# Patient Record
Sex: MALE | Race: WHITE | NOT HISPANIC OR LATINO | URBAN - METROPOLITAN AREA
[De-identification: names, ages, dates, MRNs, and addresses within clinical notes are randomized per-mention and may not be internally consistent; named-entity substitution may affect disease eponyms.]

---

## 2018-06-07 ENCOUNTER — OUTPATIENT (OUTPATIENT)
Dept: OUTPATIENT SERVICES | Facility: HOSPITAL | Age: 33
LOS: 1 days | Discharge: HOME | End: 2018-06-07

## 2018-06-07 DIAGNOSIS — R07.9 CHEST PAIN, UNSPECIFIED: ICD-10-CM

## 2022-04-05 ENCOUNTER — INPATIENT (INPATIENT)
Facility: HOSPITAL | Age: 37
LOS: 2 days | Discharge: HOME | End: 2022-04-08
Attending: INTERNAL MEDICINE | Admitting: INTERNAL MEDICINE
Payer: COMMERCIAL

## 2022-04-05 VITALS
OXYGEN SATURATION: 99 % | DIASTOLIC BLOOD PRESSURE: 114 MMHG | HEIGHT: 70 IN | HEART RATE: 104 BPM | WEIGHT: 179.9 LBS | SYSTOLIC BLOOD PRESSURE: 187 MMHG | RESPIRATION RATE: 18 BRPM | TEMPERATURE: 99 F

## 2022-04-05 DIAGNOSIS — Z90.3 ACQUIRED ABSENCE OF STOMACH [PART OF]: Chronic | ICD-10-CM

## 2022-04-05 DIAGNOSIS — K21.9 GASTRO-ESOPHAGEAL REFLUX DISEASE WITHOUT ESOPHAGITIS: Chronic | ICD-10-CM

## 2022-04-05 LAB
ALBUMIN SERPL ELPH-MCNC: 4.7 G/DL — SIGNIFICANT CHANGE UP (ref 3.5–5.2)
ALP SERPL-CCNC: 54 U/L — SIGNIFICANT CHANGE UP (ref 30–115)
ALT FLD-CCNC: 46 U/L — HIGH (ref 0–41)
ANION GAP SERPL CALC-SCNC: 15 MMOL/L — HIGH (ref 7–14)
AST SERPL-CCNC: 47 U/L — HIGH (ref 0–41)
BASE EXCESS BLDV CALC-SCNC: 3.5 MMOL/L — HIGH (ref -2–3)
BASOPHILS # BLD AUTO: 0.03 K/UL — SIGNIFICANT CHANGE UP (ref 0–0.2)
BASOPHILS NFR BLD AUTO: 0.4 % — SIGNIFICANT CHANGE UP (ref 0–1)
BILIRUB SERPL-MCNC: 1.3 MG/DL — HIGH (ref 0.2–1.2)
BUN SERPL-MCNC: 13 MG/DL — SIGNIFICANT CHANGE UP (ref 10–20)
CA-I SERPL-SCNC: 1.18 MMOL/L — SIGNIFICANT CHANGE UP (ref 1.15–1.33)
CALCIUM SERPL-MCNC: 9.6 MG/DL — SIGNIFICANT CHANGE UP (ref 8.5–10.1)
CHLORIDE SERPL-SCNC: 98 MMOL/L — SIGNIFICANT CHANGE UP (ref 98–110)
CO2 SERPL-SCNC: 26 MMOL/L — SIGNIFICANT CHANGE UP (ref 17–32)
CREAT SERPL-MCNC: 0.9 MG/DL — SIGNIFICANT CHANGE UP (ref 0.7–1.5)
EGFR: 111 ML/MIN/1.73M2 — SIGNIFICANT CHANGE UP
EOSINOPHIL # BLD AUTO: 0.09 K/UL — SIGNIFICANT CHANGE UP (ref 0–0.7)
EOSINOPHIL NFR BLD AUTO: 1.3 % — SIGNIFICANT CHANGE UP (ref 0–8)
GAS PNL BLDV: 132 MMOL/L — LOW (ref 136–145)
GAS PNL BLDV: SIGNIFICANT CHANGE UP
GLUCOSE SERPL-MCNC: 103 MG/DL — HIGH (ref 70–99)
HCO3 BLDV-SCNC: 28 MMOL/L — SIGNIFICANT CHANGE UP (ref 22–29)
HCT VFR BLD CALC: 47 % — SIGNIFICANT CHANGE UP (ref 42–52)
HCT VFR BLDA CALC: 49 % — SIGNIFICANT CHANGE UP (ref 39–51)
HGB BLD CALC-MCNC: 16.2 G/DL — SIGNIFICANT CHANGE UP (ref 12.6–17.4)
HGB BLD-MCNC: 16.4 G/DL — SIGNIFICANT CHANGE UP (ref 14–18)
IMM GRANULOCYTES NFR BLD AUTO: 0.4 % — HIGH (ref 0.1–0.3)
LACTATE BLDV-MCNC: 1.1 MMOL/L — SIGNIFICANT CHANGE UP (ref 0.5–2)
LYMPHOCYTES # BLD AUTO: 1.72 K/UL — SIGNIFICANT CHANGE UP (ref 1.2–3.4)
LYMPHOCYTES # BLD AUTO: 24.5 % — SIGNIFICANT CHANGE UP (ref 20.5–51.1)
MAGNESIUM SERPL-MCNC: 1.8 MG/DL — SIGNIFICANT CHANGE UP (ref 1.8–2.4)
MCHC RBC-ENTMCNC: 30 PG — SIGNIFICANT CHANGE UP (ref 27–31)
MCHC RBC-ENTMCNC: 34.9 G/DL — SIGNIFICANT CHANGE UP (ref 32–37)
MCV RBC AUTO: 86.1 FL — SIGNIFICANT CHANGE UP (ref 80–94)
MONOCYTES # BLD AUTO: 0.6 K/UL — SIGNIFICANT CHANGE UP (ref 0.1–0.6)
MONOCYTES NFR BLD AUTO: 8.5 % — SIGNIFICANT CHANGE UP (ref 1.7–9.3)
NEUTROPHILS # BLD AUTO: 4.56 K/UL — SIGNIFICANT CHANGE UP (ref 1.4–6.5)
NEUTROPHILS NFR BLD AUTO: 64.9 % — SIGNIFICANT CHANGE UP (ref 42.2–75.2)
NRBC # BLD: 0 /100 WBCS — SIGNIFICANT CHANGE UP (ref 0–0)
PCO2 BLDV: 40 MMHG — LOW (ref 42–55)
PH BLDV: 7.45 — HIGH (ref 7.32–7.43)
PLATELET # BLD AUTO: 223 K/UL — SIGNIFICANT CHANGE UP (ref 130–400)
PO2 BLDV: 77 MMHG — SIGNIFICANT CHANGE UP
POTASSIUM BLDV-SCNC: 3.6 MMOL/L — SIGNIFICANT CHANGE UP (ref 3.5–5.1)
POTASSIUM SERPL-MCNC: 3.8 MMOL/L — SIGNIFICANT CHANGE UP (ref 3.5–5)
POTASSIUM SERPL-SCNC: 3.8 MMOL/L — SIGNIFICANT CHANGE UP (ref 3.5–5)
PROT SERPL-MCNC: 7.3 G/DL — SIGNIFICANT CHANGE UP (ref 6–8)
RBC # BLD: 5.46 M/UL — SIGNIFICANT CHANGE UP (ref 4.7–6.1)
RBC # FLD: 12.6 % — SIGNIFICANT CHANGE UP (ref 11.5–14.5)
SAO2 % BLDV: 97.6 % — SIGNIFICANT CHANGE UP
SARS-COV-2 RNA SPEC QL NAA+PROBE: SIGNIFICANT CHANGE UP
SODIUM SERPL-SCNC: 139 MMOL/L — SIGNIFICANT CHANGE UP (ref 135–146)
WBC # BLD: 7.03 K/UL — SIGNIFICANT CHANGE UP (ref 4.8–10.8)
WBC # FLD AUTO: 7.03 K/UL — SIGNIFICANT CHANGE UP (ref 4.8–10.8)

## 2022-04-05 PROCEDURE — 99222 1ST HOSP IP/OBS MODERATE 55: CPT

## 2022-04-05 PROCEDURE — 93010 ELECTROCARDIOGRAM REPORT: CPT

## 2022-04-05 PROCEDURE — 71045 X-RAY EXAM CHEST 1 VIEW: CPT | Mod: 26

## 2022-04-05 PROCEDURE — 99285 EMERGENCY DEPT VISIT HI MDM: CPT

## 2022-04-05 RX ORDER — SODIUM CHLORIDE 9 MG/ML
1000 INJECTION, SOLUTION INTRAVENOUS ONCE
Refills: 0 | Status: COMPLETED | OUTPATIENT
Start: 2022-04-05 | End: 2022-04-05

## 2022-04-05 RX ORDER — KETOROLAC TROMETHAMINE 30 MG/ML
15 SYRINGE (ML) INJECTION ONCE
Refills: 0 | Status: DISCONTINUED | OUTPATIENT
Start: 2022-04-05 | End: 2022-04-05

## 2022-04-05 RX ORDER — THIAMINE MONONITRATE (VIT B1) 100 MG
100 TABLET ORAL ONCE
Refills: 0 | Status: COMPLETED | OUTPATIENT
Start: 2022-04-05 | End: 2022-04-05

## 2022-04-05 RX ORDER — HYDROXYZINE HCL 10 MG
25 TABLET ORAL EVERY 6 HOURS
Refills: 0 | Status: DISCONTINUED | OUTPATIENT
Start: 2022-04-05 | End: 2022-04-08

## 2022-04-05 RX ORDER — ONDANSETRON 8 MG/1
4 TABLET, FILM COATED ORAL EVERY 8 HOURS
Refills: 0 | Status: DISCONTINUED | OUTPATIENT
Start: 2022-04-05 | End: 2022-04-08

## 2022-04-05 RX ORDER — SODIUM CHLORIDE 9 MG/ML
2000 INJECTION, SOLUTION INTRAVENOUS ONCE
Refills: 0 | Status: COMPLETED | OUTPATIENT
Start: 2022-04-05 | End: 2022-04-05

## 2022-04-05 RX ORDER — PANTOPRAZOLE SODIUM 20 MG/1
40 TABLET, DELAYED RELEASE ORAL ONCE
Refills: 0 | Status: COMPLETED | OUTPATIENT
Start: 2022-04-05 | End: 2022-04-05

## 2022-04-05 RX ORDER — SODIUM CHLORIDE 9 MG/ML
1000 INJECTION, SOLUTION INTRAVENOUS
Refills: 0 | Status: DISCONTINUED | OUTPATIENT
Start: 2022-04-05 | End: 2022-04-07

## 2022-04-05 RX ORDER — ACETAMINOPHEN 500 MG
650 TABLET ORAL EVERY 6 HOURS
Refills: 0 | Status: DISCONTINUED | OUTPATIENT
Start: 2022-04-05 | End: 2022-04-08

## 2022-04-05 RX ORDER — DIAZEPAM 5 MG
5 TABLET ORAL ONCE
Refills: 0 | Status: DISCONTINUED | OUTPATIENT
Start: 2022-04-05 | End: 2022-04-05

## 2022-04-05 RX ORDER — METOCLOPRAMIDE HCL 10 MG
10 TABLET ORAL ONCE
Refills: 0 | Status: COMPLETED | OUTPATIENT
Start: 2022-04-05 | End: 2022-04-05

## 2022-04-05 RX ORDER — HYDROXYZINE HCL 10 MG
100 TABLET ORAL AT BEDTIME
Refills: 0 | Status: DISCONTINUED | OUTPATIENT
Start: 2022-04-05 | End: 2022-04-08

## 2022-04-05 RX ORDER — PANTOPRAZOLE SODIUM 20 MG/1
40 TABLET, DELAYED RELEASE ORAL
Refills: 0 | Status: DISCONTINUED | OUTPATIENT
Start: 2022-04-05 | End: 2022-04-08

## 2022-04-05 RX ADMIN — PANTOPRAZOLE SODIUM 40 MILLIGRAM(S): 20 TABLET, DELAYED RELEASE ORAL at 16:45

## 2022-04-05 RX ADMIN — SODIUM CHLORIDE 2000 MILLILITER(S): 9 INJECTION, SOLUTION INTRAVENOUS at 19:37

## 2022-04-05 RX ADMIN — SODIUM CHLORIDE 1000 MILLILITER(S): 9 INJECTION, SOLUTION INTRAVENOUS at 16:45

## 2022-04-05 RX ADMIN — Medication 15 MILLIGRAM(S): at 19:35

## 2022-04-05 RX ADMIN — Medication 5 MILLIGRAM(S): at 16:54

## 2022-04-05 RX ADMIN — Medication 25 MILLIGRAM(S): at 23:49

## 2022-04-05 RX ADMIN — Medication 5 MILLIGRAM(S): at 18:24

## 2022-04-05 RX ADMIN — Medication 100 MILLIGRAM(S): at 18:24

## 2022-04-05 RX ADMIN — Medication 10 MILLIGRAM(S): at 19:36

## 2022-04-05 NOTE — ED PROVIDER NOTE - NS ED ROS FT
Review of Systems    Constitutional: (-) fever/ chills (-)loss of appetite or  weight loss  Eyes (-) visual changes  ENT: (-) epistaxis (-) sore throat (-) ear pain  Cardiovascular: (-) chest pain, (-) syncope (-) palpitations  Respiratory: (-) cough, (-) shortness of breath  Gastrointestinal: (-) vomiting, (-) diarrhea (+) abdominal pain  : (-) dysuria , hematuria   neck: (-) neck pain or stiffness  Musculoskeletal:  (-) back pain, (-) joint pain   Integumentary: (-) rash, (-) swelling  Neurological: (-) headache, (-) altered mental status  Psychiatric: (-) hallucinations or depression

## 2022-04-05 NOTE — H&P ADULT - HISTORY OF PRESENT ILLNESS
38 y/o male with pmhx of etoh dependence & pshx of gastric sleeve.  Pt presents to ED with nausea, and tremulousness, worsening over past day. patient has been on drinking binge of hard liqueur with last drink last night.  Pt drinks approx 750ml of whiskey daily.  Has hx of Blacl outs and tremors.  Pt denies seizures.  Patient denies any SI/HI or hearing voices. no falls or head injury. patient without any hematemesis. patient c/o abdominal cramping. patient denies any leg swelling or calf pain. patient c/o palpitations and weakness. patient with decreased po intake today.      Reviewed home meds with pt

## 2022-04-05 NOTE — ED ADULT NURSE NOTE - NSIMPLEMENTINTERV_GEN_ALL_ED
Implemented All Universal Safety Interventions:  [place] to call system. Call bell, personal items and telephone within reach. Instruct patient to call for assistance. Room bathroom lighting operational. Non-slip footwear when patient is off stretcher. Physically safe environment: no spills, clutter or unnecessary equipment. Stretcher in lowest position, wheels locked, appropriate side rails in place.

## 2022-04-05 NOTE — ED PROVIDER NOTE - PHYSICAL EXAMINATION
Vital Signs: I have reviewed the initial vital signs.  Constitutional: well-nourished, no acute distress, normocephalic  Eyes: PERRLA, EOMI, clear conjunctiva  ENT: MMM, +tongue fasiculations  Cardiovascular: tachycardic, regular rhythm, no murmur appreciated  Respiratory: unlabored respiratory effort, clear to auscultation bilaterally  Gastrointestinal: soft, non-tender, non-distended  abdomen, no pulsatile mass  Musculoskeletal: supple neck, no lower extremity edema, no bony tenderness  Integumentary: warm, dry, no rash  Neurologic: awake, alert, cranial nerves II-XII grossly intact, extremities’ motor and sensory functions grossly intact, no focal deficits,  Psychiatric: appropriate mood, appropriate affect

## 2022-04-05 NOTE — ED PROVIDER NOTE - PROGRESS NOTE DETAILS
initial vital signs tachycardic and hypertensive. will give valium and iv hydration. sbirt team at bedside.   Dr. Carpio aware of admnission patient still not feeling improved. will admit

## 2022-04-05 NOTE — SBIRT NOTE ADULT - NSSBIRTALCPASSREFTXDET_GEN_A_CORE
Screening results were reviewed with the patient and patient was provided information about healthy guidelines and potential negative consequences associated with level of risk. Motivation and readiness to reduce or stop use was discussed and goals and activities to make changes were suggested/offered.     Referral for complete assessment and level of care determination at a certified treatment facility was completed by giving the patient information for treatment facilities that met their needs and encouraging them to call for an appointment. Patient is still being monitored by on site staff, provided patient with Telephonic SBIRT number to assist with referral for inpatient detox in the morning.

## 2022-04-05 NOTE — ED PROVIDER NOTE - CLINICAL SUMMARY MEDICAL DECISION MAKING FREE TEXT BOX
37-year-old male presenting for tremulousness, associated anxiety after cessation of drinking.  States that he drinks regularly, daily, last drink last night.  No other acute complaints.  No SI HI or AV hallucinations.  NCAT PERRLA EOMI tongue fasciculations neck supple non tender normal wob cta bl regular, tachycardic abdomen s nt nd no rebound no guarding WWPx4 neuro non focal. labs reviewed. pt feeling mildly improved but still sx. will admit for further eval

## 2022-04-05 NOTE — H&P ADULT - ASSESSMENT
38 y/o male with pmhx of etoh dependence & pshx of gastric sleeve.  Pt presents to ED with nausea, and tremulousness, worsening over past day. patient has been on drinking binge of hard liqueur with last drink last night.  Pt drinks approx 750ml of whiskey daily.  Has hx of Blacl outs and tremors.  Pt denies seizures.  Patient denies any SI/HI or hearing voices. no falls or head injury. patient without any hematemesis. patient c/o abdominal cramping. patient denies any leg swelling or calf pain. patient c/o palpitations and weakness. patient with decreased po intake today.      Reviewed home meds with pt    # ETOH dependence.  - addiction medicine consult  - librium protocol  - thiamine  - folic acid  - catapres for S/S of etoh W/D   - vistaril for anxiety and insomnia    # gastric sleeve  - protonix daily. 36 y/o male with pmhx of etoh dependence & pshx of gastric sleeve.  Pt presents to ED with nausea, and tremulousness, worsening over past day. patient has been on drinking binge of hard liqueur with last drink last night.  Pt drinks approx 750ml of whiskey daily.  Has hx of Blacl outs and tremors.  Pt denies seizures.  Patient denies any SI/HI or hearing voices. no falls or head injury. patient without any hematemesis. patient c/o abdominal cramping. patient denies any leg swelling or calf pain. patient c/o palpitations and weakness. patient with decreased po intake today.      Reviewed home meds with pt    # ETOH dependence   - addiction medicine consult  - librium protocol  - thiamine  - folic acid  - catapres for S/S of etoh W/D   - vistaril for anxiety and insomnia    # gastric sleeve  - protonix daily. 38 y/o male with pmhx of etoh dependence & pshx of gastric sleeve.  Pt presents to ED with nausea, and tremulousness, worsening over past day. patient has been on drinking binge of hard liqueur with last drink last night.  Pt drinks approx 750ml of whiskey daily.  Has hx of Blacl outs and tremors.  Pt denies seizures.  Patient denies any SI/HI or hearing voices. no falls or head injury. patient without any hematemesis. patient c/o abdominal cramping. patient denies any leg swelling or calf pain. patient c/o palpitations and weakness. patient with decreased po intake today.      Reviewed home meds with pt    # ETOH dependence - monitor for ETOH withdrawal  - addiction medicine consult  - librium protocol  - thiamine  - folic acid  - catapres for S/S of etoh W/D   - vistaril for anxiety and insomnia    # gastric sleeve  - protonix daily. 36 y/o male with pmhx of etoh dependence & pshx of gastric sleeve.  Pt presents to ED with nausea, and tremulousness, worsening over past day. patient has been on drinking binge of hard liqueur with last drink last night.  Pt drinks approx 750ml of whiskey daily.  Has hx of Blacl outs and tremors.  Pt denies seizures.  Patient denies any SI/HI or hearing voices. no falls or head injury. patient without any hematemesis. patient c/o abdominal cramping. patient denies any leg swelling or calf pain. patient c/o palpitations and weakness. patient with decreased po intake today.      Reviewed home meds with pt    # ETOH dependence - monitor for ETOH withdrawal  - addiction medicine consult  - librium protocol  - thiamine  - folic acid  - catapres for S/S of etoh W/D   - vistaril for anxiety and insomnia    # gastric sleeve  - protonix daily.    #elevated LFTs / elevated bili  f/u direct bili  f/u RUQ ultrasound of abdomen 38 y/o male with pmhx of etoh dependence & pshx of gastric sleeve.  Pt presents to ED with nausea, and tremulousness, worsening over past day. patient has been on drinking binge of hard liqueur with last drink last night.  Pt drinks approx 750ml of whiskey daily.  Has hx of Blacl outs and tremors.  Pt denies seizures.  Patient denies any SI/HI or hearing voices. no falls or head injury. patient without any hematemesis. patient c/o abdominal cramping. patient denies any leg swelling or calf pain. patient c/o palpitations and weakness. patient with decreased po intake today.      Reviewed home meds with pt    # ETOH dependence - monitor for ETOH withdrawal  - addiction medicine consult  - librium protocol  - thiamine  - folic acid  - catapres for S/S of etoh W/D   - vistaril for anxiety and insomnia    # gastric sleeve  - protonix daily.    #elevated LFTs / elevated bili   elevated lfts likely due to alcoholism  f/u direct bili  f/u RUQ ultrasound of abdomen 36 y/o male with pmhx of etoh dependence & pshx of gastric sleeve.  Pt presents to ED with nausea, and tremulousness, worsening over past day. patient has been on drinking binge of hard liqueur with last drink last night.  Pt drinks approx 750ml of whiskey daily.  Has hx of Blacl outs and tremors.  Pt denies seizures.  Patient denies any SI/HI or hearing voices. no falls or head injury. patient without any hematemesis. patient c/o abdominal cramping. patient denies any leg swelling or calf pain. patient c/o palpitations and weakness. patient with decreased po intake today.      Reviewed home meds with pt    # ETOH dependence - monitor for ETOH withdrawal  - addiction medicine consult  - librium 25mg x1  - thiamine  - folic acid  - catapres for S/S of etoh W/D   - vistaril for anxiety and insomnia    # gastric sleeve  - protonix daily.    #elevated LFTs / elevated bili   elevated lfts likely due to alcoholism  f/u direct bili  f/u RUQ ultrasound of abdomen

## 2022-04-05 NOTE — ED ADULT TRIAGE NOTE - CHIEF COMPLAINT QUOTE
pt states "I usually drink 1 bottle of whiskey a day. I stopped drinking last night". pt c/o tremors, headaches and random bruises on body

## 2022-04-05 NOTE — ED PROVIDER NOTE - OBJECTIVE STATEMENT
38 y/o male with hx of gastric sleeve and alcohol abuse presents to the Ed with nausea, tremulousness worsening over past day. patient has been on drinking binge of hard liqueur with last drink last night. patient denies any SI/HI or hearing voices. no falls or head injury. patient without any hematemesis. patient c/o abdominal cramping. patient denies any leg swelling or calf pain. patient c/o palpitations and weakness. patient with decreased po intake today.

## 2022-04-05 NOTE — ED PROVIDER NOTE - NS ED ATTENDING STATEMENT MOD
This was a shared visit with the CHRISTELLE. I reviewed and verified the documentation and independently performed the documented:

## 2022-04-06 LAB
ALBUMIN SERPL ELPH-MCNC: 3.6 G/DL — SIGNIFICANT CHANGE UP (ref 3.5–5.2)
ALBUMIN SERPL ELPH-MCNC: 3.7 G/DL — SIGNIFICANT CHANGE UP (ref 3.5–5.2)
ALP SERPL-CCNC: 47 U/L — SIGNIFICANT CHANGE UP (ref 30–115)
ALP SERPL-CCNC: 54 U/L — SIGNIFICANT CHANGE UP (ref 30–115)
ALT FLD-CCNC: 36 U/L — SIGNIFICANT CHANGE UP (ref 0–41)
ALT FLD-CCNC: 38 U/L — SIGNIFICANT CHANGE UP (ref 0–41)
ANION GAP SERPL CALC-SCNC: 12 MMOL/L — SIGNIFICANT CHANGE UP (ref 7–14)
ANION GAP SERPL CALC-SCNC: 12 MMOL/L — SIGNIFICANT CHANGE UP (ref 7–14)
APTT BLD: 28.4 SEC — SIGNIFICANT CHANGE UP (ref 27–39.2)
AST SERPL-CCNC: 41 U/L — SIGNIFICANT CHANGE UP (ref 0–41)
AST SERPL-CCNC: 43 U/L — HIGH (ref 0–41)
BILIRUB DIRECT SERPL-MCNC: 0.3 MG/DL — SIGNIFICANT CHANGE UP (ref 0–0.3)
BILIRUB DIRECT SERPL-MCNC: <0.2 MG/DL — SIGNIFICANT CHANGE UP (ref 0–0.3)
BILIRUB INDIRECT FLD-MCNC: 0.5 MG/DL — SIGNIFICANT CHANGE UP (ref 0.2–1.2)
BILIRUB INDIRECT FLD-MCNC: >0.3 MG/DL — SIGNIFICANT CHANGE UP (ref 0.2–1.2)
BILIRUB SERPL-MCNC: 0.5 MG/DL — SIGNIFICANT CHANGE UP (ref 0.2–1.2)
BILIRUB SERPL-MCNC: 0.8 MG/DL — SIGNIFICANT CHANGE UP (ref 0.2–1.2)
BILIRUB SERPL-MCNC: 0.8 MG/DL — SIGNIFICANT CHANGE UP (ref 0.2–1.2)
BUN SERPL-MCNC: 12 MG/DL — SIGNIFICANT CHANGE UP (ref 10–20)
BUN SERPL-MCNC: 14 MG/DL — SIGNIFICANT CHANGE UP (ref 10–20)
CALCIUM SERPL-MCNC: 8.7 MG/DL — SIGNIFICANT CHANGE UP (ref 8.5–10.1)
CALCIUM SERPL-MCNC: 9.4 MG/DL — SIGNIFICANT CHANGE UP (ref 8.5–10.1)
CHLORIDE SERPL-SCNC: 100 MMOL/L — SIGNIFICANT CHANGE UP (ref 98–110)
CHLORIDE SERPL-SCNC: 104 MMOL/L — SIGNIFICANT CHANGE UP (ref 98–110)
CO2 SERPL-SCNC: 24 MMOL/L — SIGNIFICANT CHANGE UP (ref 17–32)
CO2 SERPL-SCNC: 29 MMOL/L — SIGNIFICANT CHANGE UP (ref 17–32)
CREAT SERPL-MCNC: 1 MG/DL — SIGNIFICANT CHANGE UP (ref 0.7–1.5)
CREAT SERPL-MCNC: 1.1 MG/DL — SIGNIFICANT CHANGE UP (ref 0.7–1.5)
EGFR: 88 ML/MIN/1.73M2 — SIGNIFICANT CHANGE UP
EGFR: 98 ML/MIN/1.73M2 — SIGNIFICANT CHANGE UP
GLUCOSE SERPL-MCNC: 107 MG/DL — HIGH (ref 70–99)
GLUCOSE SERPL-MCNC: 141 MG/DL — HIGH (ref 70–99)
HCT VFR BLD CALC: 40.4 % — LOW (ref 42–52)
HGB BLD-MCNC: 13.6 G/DL — LOW (ref 14–18)
INR BLD: 0.91 RATIO — SIGNIFICANT CHANGE UP (ref 0.65–1.3)
MAGNESIUM SERPL-MCNC: 1.7 MG/DL — LOW (ref 1.8–2.4)
MCHC RBC-ENTMCNC: 30.3 PG — SIGNIFICANT CHANGE UP (ref 27–31)
MCHC RBC-ENTMCNC: 33.7 G/DL — SIGNIFICANT CHANGE UP (ref 32–37)
MCV RBC AUTO: 90 FL — SIGNIFICANT CHANGE UP (ref 80–94)
NRBC # BLD: 0 /100 WBCS — SIGNIFICANT CHANGE UP (ref 0–0)
PHOSPHATE SERPL-MCNC: 3.4 MG/DL — SIGNIFICANT CHANGE UP (ref 2.1–4.9)
PLATELET # BLD AUTO: 185 K/UL — SIGNIFICANT CHANGE UP (ref 130–400)
POTASSIUM SERPL-MCNC: 4 MMOL/L — SIGNIFICANT CHANGE UP (ref 3.5–5)
POTASSIUM SERPL-MCNC: 4.1 MMOL/L — SIGNIFICANT CHANGE UP (ref 3.5–5)
POTASSIUM SERPL-SCNC: 4 MMOL/L — SIGNIFICANT CHANGE UP (ref 3.5–5)
POTASSIUM SERPL-SCNC: 4.1 MMOL/L — SIGNIFICANT CHANGE UP (ref 3.5–5)
PROT SERPL-MCNC: 5.6 G/DL — LOW (ref 6–8)
PROT SERPL-MCNC: 5.6 G/DL — LOW (ref 6–8)
PROTHROM AB SERPL-ACNC: 10.5 SEC — SIGNIFICANT CHANGE UP (ref 9.95–12.87)
RBC # BLD: 4.49 M/UL — LOW (ref 4.7–6.1)
RBC # FLD: 12.6 % — SIGNIFICANT CHANGE UP (ref 11.5–14.5)
SODIUM SERPL-SCNC: 140 MMOL/L — SIGNIFICANT CHANGE UP (ref 135–146)
SODIUM SERPL-SCNC: 141 MMOL/L — SIGNIFICANT CHANGE UP (ref 135–146)
WBC # BLD: 4.81 K/UL — SIGNIFICANT CHANGE UP (ref 4.8–10.8)
WBC # FLD AUTO: 4.81 K/UL — SIGNIFICANT CHANGE UP (ref 4.8–10.8)

## 2022-04-06 PROCEDURE — 93010 ELECTROCARDIOGRAM REPORT: CPT

## 2022-04-06 PROCEDURE — 99232 SBSQ HOSP IP/OBS MODERATE 35: CPT

## 2022-04-06 PROCEDURE — 76705 ECHO EXAM OF ABDOMEN: CPT | Mod: 26

## 2022-04-06 RX ORDER — OMEPRAZOLE 10 MG/1
1 CAPSULE, DELAYED RELEASE ORAL
Qty: 0 | Refills: 0 | DISCHARGE

## 2022-04-06 RX ORDER — FOLIC ACID 0.8 MG
1 TABLET ORAL DAILY
Refills: 0 | Status: DISCONTINUED | OUTPATIENT
Start: 2022-04-06 | End: 2022-04-08

## 2022-04-06 RX ORDER — THIAMINE MONONITRATE (VIT B1) 100 MG
100 TABLET ORAL DAILY
Refills: 0 | Status: DISCONTINUED | OUTPATIENT
Start: 2022-04-06 | End: 2022-04-08

## 2022-04-06 RX ADMIN — Medication 100 MILLIGRAM(S): at 23:19

## 2022-04-06 RX ADMIN — Medication 100 MILLIGRAM(S): at 01:30

## 2022-04-06 RX ADMIN — SODIUM CHLORIDE 100 MILLILITER(S): 9 INJECTION, SOLUTION INTRAVENOUS at 08:18

## 2022-04-06 RX ADMIN — Medication 30 MILLILITER(S): at 13:06

## 2022-04-06 RX ADMIN — Medication 2 MILLIGRAM(S): at 16:51

## 2022-04-06 RX ADMIN — PANTOPRAZOLE SODIUM 40 MILLIGRAM(S): 20 TABLET, DELAYED RELEASE ORAL at 05:41

## 2022-04-06 RX ADMIN — ONDANSETRON 4 MILLIGRAM(S): 8 TABLET, FILM COATED ORAL at 13:06

## 2022-04-06 RX ADMIN — Medication 0.1 MILLIGRAM(S): at 16:51

## 2022-04-06 RX ADMIN — Medication 1 MILLIGRAM(S): at 13:05

## 2022-04-06 RX ADMIN — Medication 1 MILLIGRAM(S): at 21:43

## 2022-04-06 RX ADMIN — SODIUM CHLORIDE 100 MILLILITER(S): 9 INJECTION, SOLUTION INTRAVENOUS at 01:23

## 2022-04-06 NOTE — PROGRESS NOTE ADULT - ASSESSMENT
38 y/o male with pmhx of etoh dependence & pshx of gastric sleeve.  Pt presents to ED with nausea, and tremulousness, worsening over past day. patient has been on drinking binge of hard liqueur with last drink last night.  Pt drinks approx 750ml of whiskey daily.  Has hx of Blacl outs and tremors.  Pt denies seizures.  Patient denies any SI/HI or hearing voices. no falls or head injury. patient without any hematemesis. patient c/o abdominal cramping. patient denies any leg swelling or calf pain. Patient c/o palpitations and weakness. Patient with decreased po intake today.      Reviewed home meds with pt    # ETOH dependence - monitor for ETOH withdrawal  - addiction medicine consult  - librium 25mg x1  - thiamine  - folic acid  - catapres for S/S of etoh W/D   - vistaril for anxiety and insomnia  -currently on lorazepam oral Q4, will switch to lativan protocol once on floor as current location does not allow.     # gastric sleeve  - protonix daily.    #elevated LFTs / elevated bili   elevated lfts likely due to alcoholism   direct bili WNL  RUQ ultrasound of abdomen, negative

## 2022-04-06 NOTE — CHART NOTE - NSCHARTNOTEFT_GEN_A_CORE
Called by Dr. Llanos and he states he dis consult for patient and states the following:  -would d/c current ativan taper (likely excessive) and switch to symptom-triggered CIWA with librium instead, with librium 25mg q2h PRN for CIWA-Ar score >8  -thiamine, folate, and multivitamin supplementation    Was informed that [facility] nursing does not do CIWA protocol.  Tried to contact Dr. Llanos to inform.    I will DC standing Ativan taper and will order Ativan 1 mg PO q 4 hours x 24 hours and then medical team to reassess tomorrow.  I discussed with Dr. Jerez and agrees.

## 2022-04-06 NOTE — CONSULT NOTE ADULT - SUBJECTIVE AND OBJECTIVE BOX
Subjective:    HPI:  DENISE LY is a 37y old Male, PMHx of alcohol use disorder, gastric sleeve in place, ADHD, who was admitted for alcohol withdrawal. Addiction Medicine consulted for EtOH dependence.  Patient evaluated at bedside, chart reviewed. He reports drinking approximately 750ml of whiskey daily with escalating use for past several months. Denies any prior detoxes or rehabs. Denies any DTs or withdrawal seizures.  Endorses daily cannabis use, denies any other substance use. Reports that he does not feel like he is currently withdrawing.  He denies symptoms suggestive of acute psychiatric decompensation.      Objective:    Vitals:  Vital Signs Last 24 Hrs  T(C): 36.2 (06 Apr 2022 15:57), Max: 36.2 (06 Apr 2022 15:57)  T(F): 97.2 (06 Apr 2022 15:57), Max: 97.2 (06 Apr 2022 15:57)  HR: 75 (06 Apr 2022 15:57) (67 - 77)  BP: 161/103 (06 Apr 2022 15:57) (148/97 - 161/103)  BP(mean): --  RR: 18 (06 Apr 2022 15:57) (16 - 18)  SpO2: 99% (06 Apr 2022 14:05) (98% - 99%)    Labs:                        13.6   4.81  )-----------( 185      ( 06 Apr 2022 06:58 )             40.4     04-06    141  |  100  |  14  ----------------------------<  107<H>  4.0   |  29  |  1.1    Ca    9.4      06 Apr 2022 06:58  Mg     1.8     04-05    TPro  5.6<L>  /  Alb  3.7  /  TBili  0.8  /  DBili  0.3  /  AST  41  /  ALT  36  /  AlkPhos  54  04-06        CIWA-Ar: 4 (for anxiety, mild tactile disturbances)

## 2022-04-06 NOTE — CONSULT NOTE ADULT - ASSESSMENT
Assessment:  DENISE LY is a 37y old Male, PMHx of alcohol use disorder, gastric sleeve in place, ADHD, who was admitted for alcohol withdrawal. Addiction Medicine consulted for EtOH dependence.  Patient's current CIWA-Ar score of 4, indicative of minimal withdrawal, with relatively small amount of benzodiazepines received thus far. While patient remains at risk of withdrawal especially given timing of last drink, patient at lower risk of complicated withdrawals given history and current presentation. Given no hepatic impairment, librium is preferred over ativan for this patient due to longer half-life.      Recommendations:  -would d/c current ativan taper (likely excessive) and switch to symptom-triggered CIWA with librium instead, with librium 25mg q2h PRN for CIWA-Ar score >8  -thiamine, folate, and multivitamin supplementation  -CATCH team following patient

## 2022-04-06 NOTE — CHART NOTE - NSCHARTNOTEFT_GEN_A_CORE
Patient admitted to the to ED with nausea, and tremulousness, worsening over past day.   Patient has been on drinking binge of hard liqueur with last drink last night.    Pt drinks approximately 750ml of whiskey daily.  Has hx of Black outs and tremors.  Pt denies seizures.    Patient needs ativan protocol but unable to order because not on a unit and unable to order tapers if in ER because [place] will not allow.  As a result, I will order Ativan 2 mg PO q 4 hours as needed for alcohol withdrawals. Patient admitted to the to ED with nausea, and tremulousness, worsening over past day.   Patient has been on drinking binge of hard liqueur with last drink last night.    Pt drinks approximately 750ml of whiskey daily.  Has hx of Black outs and tremors.  Pt denies seizures.    Patient needs Ativan protocol but unable to order because not on a unit and unable to order tapers if in ER because [place] will not allow.  As a result, I will order Ativan 1 mg PO q 4 hours as needed for alcohol withdrawals.

## 2022-04-06 NOTE — CHART NOTE - NSCHARTNOTEFT_GEN_A_CORE
Case d/w Dr. Jose and states can order CIWA Ativan taper by using the CIWA taper while patient is in ER on ER hold.   Will discontinue the standing Ativan ordered this morning and start the CIWA Ativan taper now for alcohol withdrawals.    I discussed with Dr. Jerez and agrees with Ativan taper ordered.

## 2022-04-06 NOTE — PATIENT PROFILE ADULT - FALL HARM RISK - UNIVERSAL INTERVENTIONS
Bed in lowest position, wheels locked, appropriate side rails in place/Call bell, personal items and telephone in reach/Instruct patient to call for assistance before getting out of bed or chair/Non-slip footwear when patient is out of bed/[place] to call system/Physically safe environment - no spills, clutter or unnecessary equipment/Purposeful Proactive Rounding/Room/bathroom lighting operational, light cord in reach

## 2022-04-07 ENCOUNTER — TRANSCRIPTION ENCOUNTER (OUTPATIENT)
Age: 37
End: 2022-04-07

## 2022-04-07 DIAGNOSIS — F10.239 ALCOHOL DEPENDENCE WITH WITHDRAWAL, UNSPECIFIED: ICD-10-CM

## 2022-04-07 LAB
ANION GAP SERPL CALC-SCNC: 8 MMOL/L — SIGNIFICANT CHANGE UP (ref 7–14)
BUN SERPL-MCNC: 10 MG/DL — SIGNIFICANT CHANGE UP (ref 10–20)
CALCIUM SERPL-MCNC: 9 MG/DL — SIGNIFICANT CHANGE UP (ref 8.5–10.1)
CHLORIDE SERPL-SCNC: 104 MMOL/L — SIGNIFICANT CHANGE UP (ref 98–110)
CO2 SERPL-SCNC: 32 MMOL/L — SIGNIFICANT CHANGE UP (ref 17–32)
CREAT SERPL-MCNC: 1 MG/DL — SIGNIFICANT CHANGE UP (ref 0.7–1.5)
EGFR: 98 ML/MIN/1.73M2 — SIGNIFICANT CHANGE UP
GLUCOSE SERPL-MCNC: 111 MG/DL — HIGH (ref 70–99)
POTASSIUM SERPL-MCNC: 4.6 MMOL/L — SIGNIFICANT CHANGE UP (ref 3.5–5)
POTASSIUM SERPL-SCNC: 4.6 MMOL/L — SIGNIFICANT CHANGE UP (ref 3.5–5)
SODIUM SERPL-SCNC: 144 MMOL/L — SIGNIFICANT CHANGE UP (ref 135–146)

## 2022-04-07 PROCEDURE — 99221 1ST HOSP IP/OBS SF/LOW 40: CPT

## 2022-04-07 PROCEDURE — 99232 SBSQ HOSP IP/OBS MODERATE 35: CPT

## 2022-04-07 RX ADMIN — Medication 0.5 MILLIGRAM(S): at 22:28

## 2022-04-07 RX ADMIN — Medication 0.5 MILLIGRAM(S): at 14:25

## 2022-04-07 RX ADMIN — Medication 1 MILLIGRAM(S): at 09:56

## 2022-04-07 RX ADMIN — Medication 100 MILLIGRAM(S): at 09:57

## 2022-04-07 RX ADMIN — Medication 0.5 MILLIGRAM(S): at 18:23

## 2022-04-07 RX ADMIN — PANTOPRAZOLE SODIUM 40 MILLIGRAM(S): 20 TABLET, DELAYED RELEASE ORAL at 05:08

## 2022-04-07 RX ADMIN — Medication 650 MILLIGRAM(S): at 22:26

## 2022-04-07 RX ADMIN — Medication 1 MILLIGRAM(S): at 05:08

## 2022-04-07 RX ADMIN — Medication 650 MILLIGRAM(S): at 21:19

## 2022-04-07 RX ADMIN — Medication 1 TABLET(S): at 09:56

## 2022-04-07 RX ADMIN — Medication 25 MILLIGRAM(S): at 22:28

## 2022-04-07 NOTE — PROGRESS NOTE ADULT - ASSESSMENT
38 y/o male with pmhx of etoh dependence & pshx of gastric sleeve.  Pt presents to ED with nausea, and tremulousness, worsening over past day. patient has been on drinking binge of hard liqueur with last drink last night.  Pt drinks approx 750ml of whiskey daily.  Has hx of Blacl outs and tremors.  Pt denies seizures.  Patient denies any SI/HI or hearing voices. no falls or head injury. patient without any hematemesis. patient c/o abdominal cramping. patient denies any leg swelling or calf pain. Patient c/o palpitations and weakness. Patient with decreased po intake today.      Reviewed home meds with pt    # ETOH dependence - monitor for ETOH withdrawal  - addiction medicine consult  - librium 25mg x1  - thiamine  - folic acid  - catapres for S/S of etoh W/D   - vistaril for anxiety and insomnia  -currently on lorazepam oral 0.5 Q4,   addiction team following     # gastric sleeve  - protonix daily.    #elevated LFTs / elevated bili   elevated lfts likely due to alcoholism   direct bili WNL  RUQ ultrasound of abdomen, negative     #thiamine/folic deficiency  on thiamine and folic acid supplements

## 2022-04-08 ENCOUNTER — TRANSCRIPTION ENCOUNTER (OUTPATIENT)
Age: 37
End: 2022-04-08

## 2022-04-08 VITALS
DIASTOLIC BLOOD PRESSURE: 88 MMHG | TEMPERATURE: 96 F | HEART RATE: 76 BPM | RESPIRATION RATE: 18 BRPM | SYSTOLIC BLOOD PRESSURE: 142 MMHG

## 2022-04-08 PROCEDURE — 99231 SBSQ HOSP IP/OBS SF/LOW 25: CPT

## 2022-04-08 PROCEDURE — 99239 HOSP IP/OBS DSCHRG MGMT >30: CPT

## 2022-04-08 RX ORDER — NALTREXONE HYDROCHLORIDE 50 MG/1
1 TABLET, FILM COATED ORAL
Qty: 30 | Refills: 0
Start: 2022-04-08 | End: 2022-05-07

## 2022-04-08 RX ORDER — FOLIC ACID 0.8 MG
1 TABLET ORAL
Qty: 30 | Refills: 0
Start: 2022-04-08 | End: 2022-05-07

## 2022-04-08 RX ORDER — NALTREXONE HYDROCHLORIDE 50 MG/1
50 TABLET, FILM COATED ORAL DAILY
Refills: 0 | Status: DISCONTINUED | OUTPATIENT
Start: 2022-04-08 | End: 2022-04-08

## 2022-04-08 RX ORDER — THIAMINE MONONITRATE (VIT B1) 100 MG
1 TABLET ORAL
Qty: 30 | Refills: 0
Start: 2022-04-08 | End: 2022-05-07

## 2022-04-08 RX ADMIN — PANTOPRAZOLE SODIUM 40 MILLIGRAM(S): 20 TABLET, DELAYED RELEASE ORAL at 07:21

## 2022-04-08 RX ADMIN — Medication 0.5 MILLIGRAM(S): at 05:29

## 2022-04-08 RX ADMIN — Medication 0.5 MILLIGRAM(S): at 02:37

## 2022-04-08 RX ADMIN — Medication 25 MILLIGRAM(S): at 05:29

## 2022-04-08 NOTE — PROGRESS NOTE ADULT - SUBJECTIVE AND OBJECTIVE BOX
Pt interviewed, examined and EMR chart reviewed.    Follow up of Alcohol Depenency. Pt is on ativan protocol. Pt is doing better.  Pt with no complaints at this time feeling better.    SOCIAL HISTORY:    REVIEW OF SYSTEMS:    Constitutional: No fever, weight loss or fatigue  ENT:  No difficulty hearing, tinnitus, vertigo; No sinus or throat pain  Neck: No pain or stiffness  Respiratory: No cough, wheezing, chills or hemoptysis  Cardiovascular: No chest pain, palpitations, shortness of breath, dizziness or leg swelling  Gastrointestinal: No abdominal or epigastric pain. No nausea, vomiting or hematemesis; No diarrhea or constipation. No melena or hematochezia.  Neurological: No headaches, memory loss, loss of strength, numbness or tremors  Musculoskeletal: No joint pain or swelling; No muscle, back or extremity pain      MEDICATIONS  (STANDING):  folic acid 1 milliGRAM(s) Oral daily  LORazepam     Tablet 0.5 milliGRAM(s) Oral every 4 hours  multivitamin 1 Tablet(s) Oral daily  pantoprazole    Tablet 40 milliGRAM(s) Oral before breakfast  thiamine 100 milliGRAM(s) Oral daily    MEDICATIONS  (PRN):  acetaminophen     Tablet .. 650 milliGRAM(s) Oral every 6 hours PRN Temp greater or equal to 38C (100.4F), Mild Pain (1 - 3)  aluminum hydroxide/magnesium hydroxide/simethicone Suspension 30 milliLiter(s) Oral every 4 hours PRN Dyspepsia  cloNIDine 0.1 milliGRAM(s) Oral every 8 hours PRN for S/S of ETOH W/D, for bp >/= 140/90  hydrOXYzine hydrochloride 25 milliGRAM(s) Oral every 6 hours PRN Anxiety  hydrOXYzine hydrochloride 100 milliGRAM(s) Oral at bedtime PRN sleep  ondansetron Injectable 4 milliGRAM(s) IV Push every 8 hours PRN Nausea and/or Vomiting      Vital Signs Last 24 Hrs  T(C): 36 (07 Apr 2022 05:02), Max: 36.2 (06 Apr 2022 15:57)  T(F): 96.8 (07 Apr 2022 05:02), Max: 97.2 (06 Apr 2022 15:57)  HR: 64 (07 Apr 2022 05:02) (64 - 77)  BP: 138/89 (07 Apr 2022 05:02) (127/69 - 161/103)  BP(mean): --  RR: 18 (07 Apr 2022 05:02) (18 - 18)  SpO2: 99% (06 Apr 2022 14:05) (99% - 99%)    PHYSICAL EXAM:    Constitutional: NAD, well-groomed, well-developed  HEENT: PERRLA, EOMI, Normal Hearing, MMM  Neck: No LAD, No JVD  Back: Normal spine flexure, No CVA tenderness  Respiratory: CTAB/L  Cardiovascular: S1 and S2, RRR, no M/G/R  Gastrointestinal: BS+, soft, NT/ND  Extremities: No peripheral edema  Vascular: 2+ peripheral pulses  Neurological: A/O x 3, no focal deficits    LABS:                        13.6   4.81  )-----------( 185      ( 06 Apr 2022 06:58 )             40.4     04-07    144  |  104  |  10  ----------------------------<  111<H>  4.6   |  32  |  1.0    Ca    9.0      07 Apr 2022 08:30  Phos  3.4     04-06  Mg     1.7     04-06    TPro  5.6<L>  /  Alb  3.6  /  TBili  0.5  /  DBili  <0.2  /  AST  43<H>  /  ALT  38  /  AlkPhos  47  04-06    PT/INR - ( 06 Apr 2022 06:58 )   PT: 10.50 sec;   INR: 0.91 ratio         PTT - ( 06 Apr 2022 06:58 )  PTT:28.4 sec    Drug Screen Urine:  Alcohol Level        RADIOLOGY & ADDITIONAL STUDIES:  
  ALIYAHDENISE  37y, Male  Allergy: No Known Allergies    Hospital Day: 1d    Patient seen and examined earlier today. today patient feeling much better than   started on lorazepam 1 mg Q4. He drinks roughly a bottle a day    PMH/PSH:  PAST MEDICAL & SURGICAL HISTORY:  No pertinent past medical history    H/O gastric sleeve    GERD (gastroesophageal reflux disease)        LAST 24-Hr EVENTS:    VITALS:  T(F): 96 (04-06-22 @ 05:24), Max: 98.9 (04-05-22 @ 16:25)  HR: 67 (04-06-22 @ 05:24)  BP: 148/97 (04-06-22 @ 05:24) (148/97 - 187/114)  RR: 16 (04-06-22 @ 05:24)  SpO2: 98% (04-06-22 @ 05:24)          TESTS & MEASUREMENTS:  Weight/BMI  81.6 (04-05-22 @ 16:25)  25.8 (04-05-22 @ 16:25)                          13.6   4.81  )-----------( 185      ( 06 Apr 2022 06:58 )             40.4     PT/INR - ( 06 Apr 2022 06:58 )   PT: 10.50 sec;   INR: 0.91 ratio         PTT - ( 06 Apr 2022 06:58 )  PTT:28.4 sec  INR: 0.91 ratio (04-06-22 @ 06:58)    04-06    141  |  100  |  14  ----------------------------<  107<H>  4.0   |  29  |  1.1    Ca    9.4      06 Apr 2022 06:58  Mg     1.8     04-05    TPro  5.6<L>  /  Alb  3.7  /  TBili  0.8  /  DBili  0.3  /  AST  41  /  ALT  36  /  AlkPhos  54  04-06    LIVER FUNCTIONS - ( 06 Apr 2022 06:58 )  Alb: 3.7 g/dL / Pro: 5.6 g/dL / ALK PHOS: 54 U/L / ALT: 36 U/L / AST: 41 U/L / GGT: x                           COVID-19 PCR: NotDetec (04-05-22 @ 17:15)                RADIOLOGY, ECG, & ADDITIONAL TESTS:  12 Lead ECG:   Ventricular Rate 63 BPM    Atrial Rate 63 BPM    P-R Interval 144 ms    QRS Duration 96 ms    Q-T Interval 386 ms    QTC Calculation(Bazett) 395 ms    P Axis 8 degrees    R Axis -15 degrees    T Axis -3 degrees    Diagnosis Line Normal sinus rhythm  Minimal voltage criteria for LVH, may be normal variant  Borderline ECG    Confirmed by ZULEYKA MCCORMACK MD ([id number]) on 4/6/2022 11:37:45 AM (04-06-22 @ 08:53)      RECENT DIAGNOSTIC ORDERS:  Diet, Regular (04-05-22 @ 21:03)      MEDICATIONS:  MEDICATIONS  (STANDING):  pantoprazole    Tablet 40 milliGRAM(s) Oral before breakfast  sodium chloride 0.45%. 1000 milliLiter(s) (100 mL/Hr) IV Continuous <Continuous>    MEDICATIONS  (PRN):  acetaminophen     Tablet .. 650 milliGRAM(s) Oral every 6 hours PRN Temp greater or equal to 38C (100.4F), Mild Pain (1 - 3)  aluminum hydroxide/magnesium hydroxide/simethicone Suspension 30 milliLiter(s) Oral every 4 hours PRN Dyspepsia  cloNIDine 0.1 milliGRAM(s) Oral every 8 hours PRN for S/S of ETOH W/D, for bp >/= 140/90  hydrOXYzine hydrochloride 25 milliGRAM(s) Oral every 6 hours PRN Anxiety  hydrOXYzine hydrochloride 100 milliGRAM(s) Oral at bedtime PRN sleep  LORazepam     Tablet 1 milliGRAM(s) Oral every 4 hours PRN Agitation / S&S of ETOH withdrawal  ondansetron Injectable 4 milliGRAM(s) IV Push every 8 hours PRN Nausea and/or Vomiting      HOME MEDICATIONS:  omeprazole 20 mg oral delayed release tablet (04-05)      PHYSICAL EXAM:  GENERAL: patient sitting on bed comfortable   CHEST/LUNG: NVB, no wheezing   HEART: r1+r2, RRR  ABDOMEN: soft non tender,   EXTREMITIES:  no edema, no bruising       
  DENISE LY  37y, Male  Allergy: No Known Allergies    Hospital Day: 1d    Patient seen and examined earlier today. today patient feeling much better than   on 05 mg ativan every 4hours  likely discharge tomorrow     PMH/PSH:  PAST MEDICAL & SURGICAL HISTORY:  No pertinent past medical history    H/O gastric sleeve    GERD (gastroesophageal reflux disease)        LAST 24-Hr EVENTS:    VITALS:  T(F): 96 (04-06-22 @ 05:24), Max: 98.9 (04-05-22 @ 16:25)  HR: 67 (04-06-22 @ 05:24)  BP: 148/97 (04-06-22 @ 05:24) (148/97 - 187/114)  RR: 16 (04-06-22 @ 05:24)  SpO2: 98% (04-06-22 @ 05:24)          TESTS & MEASUREMENTS:  Weight/BMI  81.6 (04-05-22 @ 16:25)  25.8 (04-05-22 @ 16:25)                          13.6   4.81  )-----------( 185      ( 06 Apr 2022 06:58 )             40.4     PT/INR - ( 06 Apr 2022 06:58 )   PT: 10.50 sec;   INR: 0.91 ratio         PTT - ( 06 Apr 2022 06:58 )  PTT:28.4 sec  INR: 0.91 ratio (04-06-22 @ 06:58)    04-06    141  |  100  |  14  ----------------------------<  107<H>  4.0   |  29  |  1.1    Ca    9.4      06 Apr 2022 06:58  Mg     1.8     04-05    TPro  5.6<L>  /  Alb  3.7  /  TBili  0.8  /  DBili  0.3  /  AST  41  /  ALT  36  /  AlkPhos  54  04-06    LIVER FUNCTIONS - ( 06 Apr 2022 06:58 )  Alb: 3.7 g/dL / Pro: 5.6 g/dL / ALK PHOS: 54 U/L / ALT: 36 U/L / AST: 41 U/L / GGT: x                           COVID-19 PCR: NotDetec (04-05-22 @ 17:15)                RADIOLOGY, ECG, & ADDITIONAL TESTS:  12 Lead ECG:   Ventricular Rate 63 BPM    Atrial Rate 63 BPM    P-R Interval 144 ms    QRS Duration 96 ms    Q-T Interval 386 ms    QTC Calculation(Bazett) 395 ms    P Axis 8 degrees    R Axis -15 degrees    T Axis -3 degrees    Diagnosis Line Normal sinus rhythm  Minimal voltage criteria for LVH, may be normal variant  Borderline ECG    Confirmed by ZULEYKA MCCORMACK MD ([id number]) on 4/6/2022 11:37:45 AM (04-06-22 @ 08:53)      RECENT DIAGNOSTIC ORDERS:  Diet, Regular (04-05-22 @ 21:03)      MEDICATIONS:  MEDICATIONS  (STANDING):  pantoprazole    Tablet 40 milliGRAM(s) Oral before breakfast  sodium chloride 0.45%. 1000 milliLiter(s) (100 mL/Hr) IV Continuous <Continuous>    MEDICATIONS  (PRN):  acetaminophen     Tablet .. 650 milliGRAM(s) Oral every 6 hours PRN Temp greater or equal to 38C (100.4F), Mild Pain (1 - 3)  aluminum hydroxide/magnesium hydroxide/simethicone Suspension 30 milliLiter(s) Oral every 4 hours PRN Dyspepsia  cloNIDine 0.1 milliGRAM(s) Oral every 8 hours PRN for S/S of ETOH W/D, for bp >/= 140/90  hydrOXYzine hydrochloride 25 milliGRAM(s) Oral every 6 hours PRN Anxiety  hydrOXYzine hydrochloride 100 milliGRAM(s) Oral at bedtime PRN sleep  LORazepam     Tablet 1 milliGRAM(s) Oral every 4 hours PRN Agitation / S&S of ETOH withdrawal  ondansetron Injectable 4 milliGRAM(s) IV Push every 8 hours PRN Nausea and/or Vomiting      HOME MEDICATIONS:  omeprazole 20 mg oral delayed release tablet (04-05)      PHYSICAL EXAM:  GENERAL: patient sitting on bed comfortable   CHEST/LUNG: NVB, no wheezing   HEART: r1+r2, RRR  ABDOMEN: soft non tender,   EXTREMITIES:  no edema, no bruising       
    Pt interviewed, examined and EMR chart reviewed.    Follow up of Alcohol Depenency. Pt is on ativan protocol. Pt is doing well. Pt with complaint of being emotional.    SOCIAL HISTORY:    REVIEW OF SYSTEMS:    Constitutional: No fever, weight loss or fatigue  ENT:  No difficulty hearing, tinnitus, vertigo; No sinus or throat pain  Neck: No pain or stiffness  Respiratory: No cough, wheezing, chills or hemoptysis  Cardiovascular: No chest pain, palpitations, shortness of breath, dizziness or leg swelling  Gastrointestinal: No abdominal or epigastric pain. No nausea, vomiting or hematemesis; No diarrhea or constipation. No melena or hematochezia.  Neurological: No headaches, memory loss, loss of strength, numbness or tremors  Musculoskeletal: No joint pain or swelling; No muscle, back or extremity pain      MEDICATIONS  (STANDING):  folic acid 1 milliGRAM(s) Oral daily  LORazepam     Tablet 0.5 milliGRAM(s) Oral every 4 hours  multivitamin 1 Tablet(s) Oral daily  naltrexone 50 milliGRAM(s) Oral daily  pantoprazole    Tablet 40 milliGRAM(s) Oral before breakfast  thiamine 100 milliGRAM(s) Oral daily    MEDICATIONS  (PRN):  acetaminophen     Tablet .. 650 milliGRAM(s) Oral every 6 hours PRN Temp greater or equal to 38C (100.4F), Mild Pain (1 - 3)  aluminum hydroxide/magnesium hydroxide/simethicone Suspension 30 milliLiter(s) Oral every 4 hours PRN Dyspepsia  cloNIDine 0.1 milliGRAM(s) Oral every 8 hours PRN for S/S of ETOH W/D, for bp >/= 140/90  hydrOXYzine hydrochloride 25 milliGRAM(s) Oral every 6 hours PRN Anxiety  hydrOXYzine hydrochloride 100 milliGRAM(s) Oral at bedtime PRN sleep  ondansetron Injectable 4 milliGRAM(s) IV Push every 8 hours PRN Nausea and/or Vomiting      Vital Signs Last 24 Hrs  T(C): 35.7 (08 Apr 2022 04:30), Max: 35.8 (07 Apr 2022 14:28)  T(F): 96.3 (08 Apr 2022 04:30), Max: 96.5 (07 Apr 2022 14:28)  HR: 76 (08 Apr 2022 04:30) (76 - 81)  BP: 142/88 (08 Apr 2022 04:30) (142/88 - 154/95)  BP(mean): --  RR: 18 (08 Apr 2022 04:30) (18 - 18)  SpO2: --    PHYSICAL EXAM:    Constitutional: NAD, well-groomed, well-developed  HEENT: PERRLA, EOMI, Normal Hearing, MMM  Neck: No LAD, No JVD  Back: Normal spine flexure, No CVA tenderness  Respiratory: CTAB/L  Cardiovascular: S1 and S2, RRR, no M/G/R  Gastrointestinal: BS+, soft, NT/ND  Extremities: No peripheral edema  Vascular: 2+ peripheral pulses  Neurological: A/O x 3, no focal deficits    LABS:    04-07    144  |  104  |  10  ----------------------------<  111<H>  4.6   |  32  |  1.0    Ca    9.0      07 Apr 2022 08:30  Phos  3.4     04-06  Mg     1.7     04-06    TPro  5.6<L>  /  Alb  3.6  /  TBili  0.5  /  DBili  <0.2  /  AST  43<H>  /  ALT  38  /  AlkPhos  47  04-06        Drug Screen Urine:  Alcohol Level        RADIOLOGY & ADDITIONAL STUDIES:

## 2022-04-08 NOTE — DISCHARGE NOTE PROVIDER - HOSPITAL COURSE
Patient is a 37 year old male with past medical history of ETOH dependence & Past surgical history of gastric sleeve.  Patient presents to ED with nausea, and tremulousness, worsening over 1 day. patient has been on drinking binge of hard liqueur with last drink 1 night prior to admission.  Pt drinks approx. 750ml of whiskey daily.  Has hx of Black outs and tremors.  Pt denies seizures.  Patient denies any SI/HI or hearing voices. no falls or head injury. patient without any hematemesis. patient c/o abdominal cramping. patient denies any leg swelling or calf pain. Patient c/o palpitations and weakness. Patient was admitted to medicine for ETOH dependence and to monitor for ETOH withdrawal. Addiction medicine was consulted and recommended Librium/Ativan, along with thiamine and folic acid supplementation. Patient completed the protocol, therefore, will be discharged.

## 2022-04-08 NOTE — DISCHARGE NOTE NURSING/CASE MANAGEMENT/SOCIAL WORK - NSDCPEFALRISK_GEN_ALL_CORE
For information on Fall & Injury Prevention, visit: [email or web address] OR  [email or web address] OR  https://www.cdc.gov/steadi/patient.html

## 2022-04-08 NOTE — DISCHARGE NOTE NURSING/CASE MANAGEMENT/SOCIAL WORK - PATIENT PORTAL LINK FT
You can access the FollowMyHealth Patient Portal offered by [facility] by registering at the following website: [email or web address] By joining [facility] [employer] [facility]’s FollowMyHealth portal, you will also be able to view your health information using other applications (apps) compatible with our system.

## 2022-04-08 NOTE — DISCHARGE NOTE PROVIDER - NSDCCPCAREPLAN_GEN_ALL_CORE_FT
PRINCIPAL DISCHARGE DIAGNOSIS  Diagnosis: Alcohol withdrawal  Assessment and Plan of Treatment: -  - You were seen by addiction medicine team and completed a librium/ativan protocol. You were supplemented with folic acid and thiamine. You were advised to quit ETOH consumption.

## 2022-04-08 NOTE — DISCHARGE NOTE PROVIDER - CARE PROVIDER_API CALL
Dyan Stroud)  Internal Medicine  [address] [place] [id number]  Phone: [phone]  Fax: [phone]  Follow Up Time: 1-3 days

## 2022-04-08 NOTE — DISCHARGE NOTE PROVIDER - ATTENDING DISCHARGE PHYSICAL EXAMINATION:
Patient seen. Vitally stable. Calm. no symptoms. wants ot go home. CATCH team recommended naltrexone. Patient to follow up with PCP/CATCH as outpatient. Patient understood and in agreement with discharge planning.

## 2022-04-08 NOTE — DISCHARGE NOTE PROVIDER - NSDCMRMEDTOKEN_GEN_ALL_CORE_FT
omeprazole 20 mg oral delayed release tablet: 1 tab(s) orally once a day   folic acid 1 mg oral tablet: 1 tab(s) orally once a day  Multiple Vitamins oral tablet: 1 tab(s) orally once a day  naltrexone 50 mg oral tablet: 1 tab(s) orally once a day  omeprazole 20 mg oral delayed release tablet: 1 tab(s) orally once a day  thiamine 100 mg oral tablet: 1 tab(s) orally once a day

## 2022-04-08 NOTE — PROGRESS NOTE ADULT - PROBLEM SELECTOR PLAN 1
After evaluation at this time complete protocol. Pt is amenable to starting MAT. Pt was given all the information and side effects and will start on naltrexone. Continue on thiamine and folic acid as outpatient.  Pt will be monitored and supportive care provided  CATCH team involved for aftercare and pt will follow up with aftercare outpatient in [place]
After evaluation at this time will contiue on ativan modified protocol. Pt will beon ativan .5 for 24hrs. Possible d/c in [place] if stable on 4/8.  Pt will be monitored and supportive care provided  CATCH team involved for aftercare and pt will follow up with aftercare for outpatient in [place].

## 2022-04-13 DIAGNOSIS — R25.1 TREMOR, UNSPECIFIED: ICD-10-CM

## 2022-04-13 DIAGNOSIS — Z98.84 BARIATRIC SURGERY STATUS: ICD-10-CM

## 2022-04-13 DIAGNOSIS — F10.230 ALCOHOL DEPENDENCE WITH WITHDRAWAL, UNCOMPLICATED: ICD-10-CM

## 2022-04-13 DIAGNOSIS — F10.280 ALCOHOL DEPENDENCE WITH ALCOHOL-INDUCED ANXIETY DISORDER: ICD-10-CM

## 2022-04-13 DIAGNOSIS — F90.9 ATTENTION-DEFICIT HYPERACTIVITY DISORDER, UNSPECIFIED TYPE: ICD-10-CM

## 2022-04-13 DIAGNOSIS — E51.9 THIAMINE DEFICIENCY, UNSPECIFIED: ICD-10-CM

## 2022-04-13 DIAGNOSIS — F41.9 ANXIETY DISORDER, UNSPECIFIED: ICD-10-CM

## 2022-04-13 DIAGNOSIS — E53.8 DEFICIENCY OF OTHER SPECIFIED B GROUP VITAMINS: ICD-10-CM

## 2022-04-13 DIAGNOSIS — K21.9 GASTRO-ESOPHAGEAL REFLUX DISEASE WITHOUT ESOPHAGITIS: ICD-10-CM

## 2022-10-07 PROBLEM — Z00.00 ENCOUNTER FOR PREVENTIVE HEALTH EXAMINATION: Status: ACTIVE | Noted: 2022-10-07
